# Patient Record
Sex: MALE | Employment: FULL TIME | URBAN - NONMETROPOLITAN AREA
[De-identification: names, ages, dates, MRNs, and addresses within clinical notes are randomized per-mention and may not be internally consistent; named-entity substitution may affect disease eponyms.]

---

## 2023-03-27 ENCOUNTER — OFFICE VISIT (OUTPATIENT)
Dept: URGENT CARE | Facility: PHYSICIAN GROUP | Age: 23
End: 2023-03-27
Payer: OTHER GOVERNMENT

## 2023-03-27 VITALS
WEIGHT: 203 LBS | HEART RATE: 76 BPM | HEIGHT: 73 IN | TEMPERATURE: 97.9 F | BODY MASS INDEX: 26.9 KG/M2 | SYSTOLIC BLOOD PRESSURE: 102 MMHG | RESPIRATION RATE: 14 BRPM | DIASTOLIC BLOOD PRESSURE: 68 MMHG | OXYGEN SATURATION: 98 %

## 2023-03-27 DIAGNOSIS — R11.2 NAUSEA VOMITING AND DIARRHEA: ICD-10-CM

## 2023-03-27 DIAGNOSIS — R19.7 NAUSEA VOMITING AND DIARRHEA: ICD-10-CM

## 2023-03-27 PROCEDURE — 99213 OFFICE O/P EST LOW 20 MIN: CPT | Performed by: NURSE PRACTITIONER

## 2023-03-27 RX ORDER — ONDANSETRON 4 MG/1
TABLET, ORALLY DISINTEGRATING ORAL
Qty: 15 TABLET | Refills: 0 | Status: SHIPPED | OUTPATIENT
Start: 2023-03-27

## 2023-03-27 RX ORDER — LOPERAMIDE HYDROCHLORIDE 2 MG/1
2 CAPSULE ORAL 4 TIMES DAILY PRN
Qty: 30 CAPSULE | Refills: 0 | Status: SHIPPED | OUTPATIENT
Start: 2023-03-27

## 2023-03-27 RX ORDER — DICYCLOMINE HCL 20 MG
TABLET ORAL
Qty: 20 TABLET | Refills: 0 | Status: SHIPPED | OUTPATIENT
Start: 2023-03-27

## 2023-03-27 ASSESSMENT — ENCOUNTER SYMPTOMS
SORE THROAT: 0
HEADACHES: 1
VOMITING: 1
CONSTIPATION: 0
BLOOD IN STOOL: 0
COUGH: 0
CHILLS: 0
DIARRHEA: 1
ABDOMINAL PAIN: 1
NAUSEA: 1
SINUS PAIN: 0
MYALGIAS: 0
FEVER: 0

## 2023-03-27 NOTE — PROGRESS NOTES
Patient has consented to treatment and for use of patient information for treatment and billing purposes.    Chief Complaint:    Chief Complaint   Patient presents with    Emesis     This morning started vomiting, diarrhea, lethargic, headache, abdominal pain, nausea, thinks it might be food poisoning         History of Present Illness: 22 y.o.  male presents to clinic with 1 day history of nausea, vomiting, diarrhea, abdominal pain, and headache.      Patient is traveling from out of town with the [place].  He states that there is 1 other person with him with similar symptoms.  He got into town last night and only ate at a pizzeria at the [employer] in [place].      Review of Systems   Constitutional:  Positive for malaise/fatigue. Negative for chills and fever.   HENT:  Negative for congestion, sinus pain and sore throat.    Respiratory:  Negative for cough.    Gastrointestinal:  Positive for abdominal pain, diarrhea, nausea and vomiting. Negative for blood in stool and constipation.   Musculoskeletal:  Negative for myalgias.   Neurological:  Positive for headaches.     Medications, Allergies, and current problem list reviewed today in Epic.    Physical Exam:    Vitals:    03/27/23 1214   BP: 102/68   Pulse: 76   Resp: 14   Temp: 36.6 °C (97.9 °F)   SpO2: 98%             Physical Exam  Vitals reviewed.   Constitutional:       General: He is not in acute distress.     Appearance: Normal appearance. He is not toxic-appearing.   Cardiovascular:      Rate and Rhythm: Normal rate and regular rhythm.      Heart sounds: No murmur heard.  Pulmonary:      Effort: Pulmonary effort is normal. No respiratory distress.      Breath sounds: Normal breath sounds. No wheezing, rhonchi or rales.   Abdominal:      General: Abdomen is flat. Bowel sounds are normal.      Tenderness: There is generalized abdominal tenderness. There is no guarding or rebound. Negative signs include Mar's sign, Rovsing's sign, McBurney's sign,  psoas sign and obturator sign.      Hernia: No hernia is present.   Musculoskeletal:      Cervical back: Normal range of motion.   Skin:     General: Skin is warm and dry.   Neurological:      Mental Status: He is alert.          Medical Decision Making:  I personally reviewed prior external notes and test results pertinent to today's visit.   Shared decision-making was utilized with patient did develop treatment plan and clinic course.   Janeth, 22 y.o. male, with symptoms of nausea, vomiting, diarrhea, abdominal pain, and fatigue that started today.  HPI and physical exam findings are consistent with viral gastroenteritis most likely norovirus.  Low suspicion today of appendicitis or peritonitis        The patient remained stable during the urgent care visit.    Plan:  1.  Zofran 4mg every 8 hours as needed for nausea/vomiting.  Patient was also sent home on Bentyl and Imodium as needed.  2. Encourage fluids (avoid sugary drinks) and recommended clear liquid diet for 24 hours. small meals as tolerated (avoid fatty foods and sugary foods) the next 48-72.  3. Follow up if symptoms persist/worsen, new symptoms develop or any other concerns arise.      Medication discussed included indication for use and the potential  benefits and side effects.         1. Nausea vomiting and diarrhea  - ondansetron (ZOFRAN ODT) 4 MG TABLET DISPERSIBLE; 1 TAB, ALLOW TO DISINTEGRATE IN MOUTH, EVERY 8 HOURS ONLY IF NEEDED FOR NAUSEA OR VOMITING.  Dispense: 15 Tablet; Refill: 0  - dicyclomine (BENTYL) 20 MG Tab; 1 TAB BY MOUTH EVERY 6 HOURS ONLY IF NEEDED FOR DIARRHEA AND/OR ABDOMINAL CRAMPS.  Dispense: 20 Tablet; Refill: 0  - loperamide (IMODIUM) 2 MG Cap; Take 1 Capsule by mouth 4 times a day as needed for Diarrhea.  Dispense: 30 Capsule; Refill: 0          Verbal and/or printed education was provided regarding the assessment and diagnosis.  All of the patient's questions were answered to their satisfaction at the time of  discharge.    Follow up:    Recommended f/u in  3 days if there is no improvement.    Patient was encouraged to monitor symptoms closely. Those signs and symptoms which would warrant concern and mandate seeking a higher level of service through the emergency department discussed at length and included in discharge papers.  Patient stated agreement and understanding of this plan of care.    Disposition:  Home in stable condition       Voice Recognition Disclaimer:  Portions of this document were created using voice recognition software. The software does have a chance of producing errors of grammar and possibly content. I have made every reasonable attempt to correct obvious errors, but there may be errors of grammar and possibly content that I did not discover before finalizing the documentation.

## 2023-03-27 NOTE — LETTER
[facility] [address] [address]     March 27, 2023    Patient: Latanya Pierce   YOB: 2000   Date of Visit: 3/27/2023       To Whom It May Concern:    Latanya Pierce was seen and treated in our department on 3/27/2023.  Will need to be excused from all duties due to illness until 3/29/2023.    Sincerely,     JOHNATHAN Donahue.